# Patient Record
Sex: FEMALE | Race: BLACK OR AFRICAN AMERICAN | NOT HISPANIC OR LATINO | URBAN - METROPOLITAN AREA
[De-identification: names, ages, dates, MRNs, and addresses within clinical notes are randomized per-mention and may not be internally consistent; named-entity substitution may affect disease eponyms.]

---

## 2020-09-29 ENCOUNTER — EMERGENCY (EMERGENCY)
Facility: HOSPITAL | Age: 25
LOS: 0 days | Discharge: HOME | End: 2020-09-29
Attending: STUDENT IN AN ORGANIZED HEALTH CARE EDUCATION/TRAINING PROGRAM
Payer: COMMERCIAL

## 2020-09-29 VITALS — TEMPERATURE: 98 F | SYSTOLIC BLOOD PRESSURE: 107 MMHG | HEART RATE: 74 BPM | DIASTOLIC BLOOD PRESSURE: 50 MMHG

## 2020-09-29 VITALS — WEIGHT: 164.91 LBS

## 2020-09-29 DIAGNOSIS — O23.01 INFECTIONS OF KIDNEY IN PREGNANCY, FIRST TRIMESTER: ICD-10-CM

## 2020-09-29 DIAGNOSIS — R35.0 FREQUENCY OF MICTURITION: ICD-10-CM

## 2020-09-29 DIAGNOSIS — Z3A.11 11 WEEKS GESTATION OF PREGNANCY: ICD-10-CM

## 2020-09-29 DIAGNOSIS — O26.891 OTHER SPECIFIED PREGNANCY RELATED CONDITIONS, FIRST TRIMESTER: ICD-10-CM

## 2020-09-29 DIAGNOSIS — O23.41 UNSPECIFIED INFECTION OF URINARY TRACT IN PREGNANCY, FIRST TRIMESTER: ICD-10-CM

## 2020-09-29 DIAGNOSIS — R10.31 RIGHT LOWER QUADRANT PAIN: ICD-10-CM

## 2020-09-29 LAB
ALBUMIN SERPL ELPH-MCNC: 3.5 G/DL — SIGNIFICANT CHANGE UP (ref 3.3–5)
ALP SERPL-CCNC: 61 U/L — SIGNIFICANT CHANGE UP (ref 40–120)
ALT FLD-CCNC: 18 U/L — SIGNIFICANT CHANGE UP (ref 12–78)
ANION GAP SERPL CALC-SCNC: 8 MMOL/L — SIGNIFICANT CHANGE UP (ref 5–17)
APPEARANCE UR: ABNORMAL
APTT BLD: 30.6 SEC — SIGNIFICANT CHANGE UP (ref 27.5–35.5)
AST SERPL-CCNC: 7 U/L — LOW (ref 15–37)
BASOPHILS # BLD AUTO: 0.03 K/UL — SIGNIFICANT CHANGE UP (ref 0–0.2)
BASOPHILS NFR BLD AUTO: 0.3 % — SIGNIFICANT CHANGE UP (ref 0–2)
BILIRUB SERPL-MCNC: 0.2 MG/DL — SIGNIFICANT CHANGE UP (ref 0.2–1.2)
BILIRUB UR-MCNC: NEGATIVE — SIGNIFICANT CHANGE UP
BUN SERPL-MCNC: 5 MG/DL — LOW (ref 7–23)
CALCIUM SERPL-MCNC: 8.8 MG/DL — SIGNIFICANT CHANGE UP (ref 8.5–10.1)
CHLORIDE SERPL-SCNC: 106 MMOL/L — SIGNIFICANT CHANGE UP (ref 96–108)
CO2 SERPL-SCNC: 22 MMOL/L — SIGNIFICANT CHANGE UP (ref 22–31)
COLOR SPEC: YELLOW — SIGNIFICANT CHANGE UP
CREAT SERPL-MCNC: 0.48 MG/DL — LOW (ref 0.5–1.3)
DIFF PNL FLD: ABNORMAL
EOSINOPHIL # BLD AUTO: 0.12 K/UL — SIGNIFICANT CHANGE UP (ref 0–0.5)
EOSINOPHIL NFR BLD AUTO: 1.1 % — SIGNIFICANT CHANGE UP (ref 0–6)
GLUCOSE SERPL-MCNC: 93 MG/DL — SIGNIFICANT CHANGE UP (ref 70–99)
GLUCOSE UR QL: NEGATIVE MG/DL — SIGNIFICANT CHANGE UP
HCG SERPL-ACNC: HIGH MIU/ML
HCT VFR BLD CALC: 37.3 % — SIGNIFICANT CHANGE UP (ref 34.5–45)
HGB BLD-MCNC: 12.4 G/DL — SIGNIFICANT CHANGE UP (ref 11.5–15.5)
IMM GRANULOCYTES NFR BLD AUTO: 0.5 % — SIGNIFICANT CHANGE UP (ref 0–1.5)
INR BLD: 1.05 RATIO — SIGNIFICANT CHANGE UP (ref 0.88–1.16)
KETONES UR-MCNC: ABNORMAL
LEUKOCYTE ESTERASE UR-ACNC: ABNORMAL
LIDOCAIN IGE QN: 48 U/L — LOW (ref 73–393)
LYMPHOCYTES # BLD AUTO: 3.74 K/UL — HIGH (ref 1–3.3)
LYMPHOCYTES # BLD AUTO: 34 % — SIGNIFICANT CHANGE UP (ref 13–44)
MCHC RBC-ENTMCNC: 30.8 PG — SIGNIFICANT CHANGE UP (ref 27–34)
MCHC RBC-ENTMCNC: 33.2 GM/DL — SIGNIFICANT CHANGE UP (ref 32–36)
MCV RBC AUTO: 92.6 FL — SIGNIFICANT CHANGE UP (ref 80–100)
MONOCYTES # BLD AUTO: 0.83 K/UL — SIGNIFICANT CHANGE UP (ref 0–0.9)
MONOCYTES NFR BLD AUTO: 7.5 % — SIGNIFICANT CHANGE UP (ref 2–14)
NEUTROPHILS # BLD AUTO: 6.23 K/UL — SIGNIFICANT CHANGE UP (ref 1.8–7.4)
NEUTROPHILS NFR BLD AUTO: 56.6 % — SIGNIFICANT CHANGE UP (ref 43–77)
NITRITE UR-MCNC: POSITIVE
PH UR: 6 — SIGNIFICANT CHANGE UP (ref 5–8)
PLATELET # BLD AUTO: 251 K/UL — SIGNIFICANT CHANGE UP (ref 150–400)
POTASSIUM SERPL-MCNC: 3.3 MMOL/L — LOW (ref 3.5–5.3)
POTASSIUM SERPL-SCNC: 3.3 MMOL/L — LOW (ref 3.5–5.3)
PROT SERPL-MCNC: 7.6 GM/DL — SIGNIFICANT CHANGE UP (ref 6–8.3)
PROT UR-MCNC: 30 MG/DL
PROTHROM AB SERPL-ACNC: 12.2 SEC — SIGNIFICANT CHANGE UP (ref 10.6–13.6)
RBC # BLD: 4.03 M/UL — SIGNIFICANT CHANGE UP (ref 3.8–5.2)
RBC # FLD: 12.1 % — SIGNIFICANT CHANGE UP (ref 10.3–14.5)
SODIUM SERPL-SCNC: 136 MMOL/L — SIGNIFICANT CHANGE UP (ref 135–145)
SP GR SPEC: 1.02 — SIGNIFICANT CHANGE UP (ref 1.01–1.02)
UROBILINOGEN FLD QL: 1 MG/DL
WBC # BLD: 11 K/UL — HIGH (ref 3.8–10.5)
WBC # FLD AUTO: 11 K/UL — HIGH (ref 3.8–10.5)

## 2020-09-29 RX ORDER — POTASSIUM CHLORIDE 20 MEQ
40 PACKET (EA) ORAL ONCE
Refills: 0 | Status: COMPLETED | OUTPATIENT
Start: 2020-09-29 | End: 2020-09-29

## 2020-09-29 RX ORDER — CEFUROXIME AXETIL 250 MG
1 TABLET ORAL
Qty: 14 | Refills: 0
Start: 2020-09-29 | End: 2020-10-05

## 2020-09-29 RX ORDER — CEFTRIAXONE 500 MG/1
1000 INJECTION, POWDER, FOR SOLUTION INTRAMUSCULAR; INTRAVENOUS ONCE
Refills: 0 | Status: COMPLETED | OUTPATIENT
Start: 2020-09-29 | End: 2020-09-29

## 2020-09-29 RX ORDER — ACETAMINOPHEN 500 MG
650 TABLET ORAL ONCE
Refills: 0 | Status: COMPLETED | OUTPATIENT
Start: 2020-09-29 | End: 2020-09-29

## 2020-09-29 RX ORDER — SODIUM CHLORIDE 9 MG/ML
1000 INJECTION INTRAMUSCULAR; INTRAVENOUS; SUBCUTANEOUS ONCE
Refills: 0 | Status: COMPLETED | OUTPATIENT
Start: 2020-09-29 | End: 2020-09-29

## 2020-09-29 RX ADMIN — Medication 650 MILLIGRAM(S): at 17:14

## 2020-09-29 RX ADMIN — SODIUM CHLORIDE 1000 MILLILITER(S): 9 INJECTION INTRAMUSCULAR; INTRAVENOUS; SUBCUTANEOUS at 17:49

## 2020-09-29 RX ADMIN — Medication 40 MILLIEQUIVALENT(S): at 18:21

## 2020-09-29 RX ADMIN — SODIUM CHLORIDE 1000 MILLILITER(S): 9 INJECTION INTRAMUSCULAR; INTRAVENOUS; SUBCUTANEOUS at 16:49

## 2020-09-29 RX ADMIN — CEFTRIAXONE 1000 MILLIGRAM(S): 500 INJECTION, POWDER, FOR SOLUTION INTRAMUSCULAR; INTRAVENOUS at 17:39

## 2020-09-29 RX ADMIN — Medication 650 MILLIGRAM(S): at 16:49

## 2020-09-29 NOTE — ED STATDOCS - PATIENT PORTAL LINK FT
You can access the FollowMyHealth Patient Portal offered by [facility] by registering at the following website: [email or web address]. By joining [employer]’s FollowMyHealth portal, you will also be able to view your health information using other applications (apps) compatible with our system.

## 2020-09-29 NOTE — ED ADULT NURSE NOTE - OBJECTIVE STATEMENT
Patient presents to the ED co 9/10 sharp back pain that wraps around to the lower part of her right abdomen that began at about 0430 this morning. Pt states that the sharp pain woke her up out of her sleep and has been getting worse ever since. Pt states that she has also had associated nausea and vomiting and has not been able to hold anything down. Pt states she recently had a UTI for which she took at home remedies. Pt also is endorsing pain in her vaginal area. Pt denies bleeding and burning with urination.

## 2020-09-29 NOTE — ED STATDOCS - CARE PLAN
Principal Discharge DX:	UTI (urinary tract infection)  Secondary Diagnosis:	Pyelonephritis affecting pregnancy in first trimester

## 2020-09-29 NOTE — ED STATDOCS - OBJECTIVE STATEMENT
26 y/o female with a PMHx of  presents to the ED c/o abd pain. Pt states that she woke up with some urinary frequency and dysuria, suprapubic pain. Pt states that she had nausea and vomiting for last month since she has been pregnant. Also c/o some right flank pain. Today pt went to her went to OB, had ultrasound, which was normal diagnosed with UTI, sent here for further evaluation. 26 y/o female with a PMHx of  presents to the ED c/o abd pain. Pt states that she woke up with some urinary frequency and dysuria, suprapubic pain. Pt states that she had nausea and vomiting for last month since she has been pregnant. Also c/o some right flank pain. Today pt went to her went to OB, had pelvic ultrasound, which was normal; diagnosed with UTI, sent here for further evaluation. 26 y/o female with a PMHx of  presents to the ED c/o abd pain. Pt states that she woke up with some urinary frequency and dysuria, suprapubic pain. Pt states that she had nausea and vomiting for last month since she has been pregnant. Also c/o some right flank pain that started today. Today pt went to her went to OB, had pelvic ultrasound, which was normal; diagnosed with UTI, sent here for further evaluation.

## 2020-09-29 NOTE — ED STATDOCS - GASTROINTESTINAL, MLM
abdomen soft, non-distended. Bowel sounds present. +moderate suprapubic tenderness, right CVA tenderness

## 2020-09-29 NOTE — ED STATDOCS - ATTENDING CONTRIBUTION TO CARE
I, Sujata Toribio DO,  performed the initial face to face bedside interview with this patient regarding history of present illness, review of symptoms and relevant past medical, social and family history.  I completed an independent physical examination.  I was the initial provider who evaluated this patient. I have signed out the follow up of any pending tests (i.e. labs, radiological studies) to the ACP.  I have communicated the patient’s plan of care and disposition with the ACP.  The history, relevant review of systems, past medical and surgical history, medical decision making, and physical examination was documented by the scribe in my presence and I attest to the accuracy of the documentation.

## 2020-09-29 NOTE — ED STATDOCS - PROGRESS NOTE DETAILS
Malu DO: Patient with positive UA- IV Rocephin given. Malu DO: patient non toxic; well appearing; afebrile; tolerating po; instructed to f/u with her ob/gyn in 1 day without fail; renal ultrasound neg; strict return precautions given. Results of Lab work and US reviewed with patient. Agreed to F/U with Obstetrician. and take Ceftin as directed.  Wili NP

## 2020-09-29 NOTE — ED STATDOCS - CARE PROVIDER_API CALL
[facility]  [address] [place] [address]  Phone: [phone]  Fax: [phone]  Follow Up Time:

## 2020-09-29 NOTE — ED STATDOCS - NSFOLLOWUPINSTRUCTIONS_ED_ALL_ED_FT
Urinary Tract Infection, Adult  ImageA urinary tract infection (UTI) is an infection of any part of the urinary tract, which includes the kidneys, ureters, bladder, and urethra. These organs make, store, and get rid of urine in the body. UTI can be a bladder infection (cystitis) or kidney infection (pyelonephritis).    What are the causes?  This infection may be caused by fungi, viruses, or bacteria. Bacteria are the most common cause of UTIs. This condition can also be caused by repeated incomplete emptying of the bladder during urination.    What increases the risk?  This condition is more likely to develop if:    You ignore your need to urinate or hold urine for long periods of time.  You do not empty your bladder completely during urination.  You wipe back to front after urinating or having a bowel movement, if you are female.  You are uncircumcised, if you are male.  You are constipated.  You have a urinary catheter that stays in place (indwelling).  You have a weak defense (immune) system.  You have a medical condition that affects your bowels, kidneys, or bladder.  You have diabetes.  You take antibiotic medicines frequently or for long periods of time, and the antibiotics no longer work well against certain types of infections (antibiotic resistance).  You take medicines that irritate your urinary tract.  You are exposed to chemicals that irritate your urinary tract.  You are female.    What are the signs or symptoms?  Symptoms of this condition include:    Fever.  Frequent urination or passing small amounts of urine frequently.  Needing to urinate urgently.  Pain or burning with urination.  Urine that smells bad or unusual.  Cloudy urine.  Pain in the lower abdomen or back.  Trouble urinating.  Blood in the urine.  Vomiting or being less hungry than normal.  Diarrhea or abdominal pain.  Vaginal discharge, if you are female.    How is this diagnosed?  This condition is diagnosed with a medical history and physical exam. You will also need to provide a urine sample to test your urine. Other tests may be done, including:    Blood tests.  Sexually transmitted disease (STD) testing.    If you have had more than one UTI, a cystoscopy or imaging studies may be done to determine the cause of the infections.    How is this treated?  Treatment for this condition often includes a combination of two or more of the following:    Antibiotic medicine.  Other medicines to treat less common causes of UTI.  Over-the-counter medicines to treat pain.  Drinking enough water to stay hydrated.    Follow these instructions at home:  Take over-the-counter and prescription medicines only as told by your health care provider.  If you were prescribed an antibiotic, take it as told by your health care provider. Do not stop taking the antibiotic even if you start to feel better.  Avoid alcohol, caffeine, tea, and carbonated beverages. They can irritate your bladder.  Drink enough fluid to keep your urine clear or pale yellow.  Keep all follow-up visits as told by your health care provider. This is important.  ImageMake sure to:    Empty your bladder often and completely. Do not hold urine for long periods of time.  Empty your bladder before and after sex.  Wipe from front to back after a bowel movement if you are female. Use each tissue one time when you wipe.    Contact a health care provider if:  You have back pain.  You have a fever.  You feel nauseous or vomit.  Your symptoms do not get better after 3 days.  Your symptoms go away and then return.  Get help right away if:  You have severe back pain or lower abdominal pain.  You are vomiting and cannot keep down any medicines or water.  This information is not intended to replace advice given to you by your health care provider. Make sure you discuss any questions you have with your health care provider.      EnglishSpanish                                                                                                                                                                                                                                 Pyelonephritis During Pregnancy           What are the causes?    This condition is caused by a bacterial infection in the lower urinary tract that spreads to the kidney.      What increases the risk?  You are more likely to develop this condition if:  •You have diabetes.      •You have a history of frequent urinary tract infections.        What are the signs or symptoms?  Symptoms of this condition may begin with symptoms of a lower urinary tract infection. These may include:  •A frequent urge to pass urine.      •Burning pain when passing urine.      •Pain and pressure in your lower abdomen.      •Blood in your urine.      •Cloudy or smelly urine.    As the infection spreads to your kidney, you may have these symptoms:  •Fever.      •Chills.      •Pain and tenderness in your upper abdomen or in your back and sides (flank pain). Flank pain often affects one side of the body, usually the right side.      •Nausea, vomiting, or loss of appetite.        How is this diagnosed?  This condition may be diagnosed based on:  •Your symptoms and medical history.      •A physical exam.     •Tests to confirm the diagnosis. These may include:  •Blood tests to check kidney function and look for signs of infection.      •Urine tests to check for signs of infection, including bacteria, white or red blood cells, and protein.      •Tests to grow and identify the type of bacteria that is causing the infection (urine culture).      •Imaging studies of your kidneys to learn more about your condition.          How is this treated?  This condition is treated in the hospital with antibiotics that are given into one of your veins through an IV. Your health care provider:  •Will start you on an antibiotic that is effective against common urinary tract infections.      •May switch to another antibiotic if the results of your urine culture show that your infection is caused by different bacteria.      •Will be careful to choose antibiotics that are the safest during pregnancy.    Other treatments may include:  •IV fluids if you are nauseous and not able to drink fluids.      •Pain and fever medicines.      •Medicines for nausea and vomiting.      You will be able to go home when your infection is under control. To prevent another infection, you may need to continue taking antibiotics by mouth until your baby is born.      Follow these instructions at home:      Medicines      •Take over-the-counter and prescription medicines only as told by your health care provider.      •Take your antibiotic medicine as told by your health care provider. Do not stop taking the antibiotic even if you start to feel better.      •Continue to take your prenatal vitamins.        Lifestyle    •Follow instructions from your health care provider about eating or drinking restrictions. You may need to avoid:  •Foods and drinks with added sugar.      •Caffeine and fruit juice.        •Drink enough fluid to keep your urine pale yellow.      •Go to the bathroom frequently. Do not hold your urine. Try to empty your bladder completely.      •Change your underwear every day. Wear all-cotton underwear. Do not wear tight underwear or pants.        General instructions    •Take these steps to lower the risk of bacteria getting into your urinary tract:  •Use liquid soap instead of bar soap when showering or bathing. Bacteria can grow on bar soap.      •When you wash yourself, clean the urethra opening first. Use a washcloth to clean the area between your vagina and anus. Pat the area dry with a clean towel.      •Wash your hands before and after you go to the bathroom.      •Wipe yourself from front to back after going to the bathroom.      •Do not use douches, perfumed soap, creams, or powders.      •Do not soak in a bath for more than 30 minutes.        •Return to your normal activities as told by your health care provider. Ask your health care provider what activities are safe for you.      •Keep all follow-up visits as told by your health care provider. This is important.        Contact a health care provider if:    •You have chills or a fever.      •You have any symptoms of infection that do not get better at home.      •Symptoms of infection come back.      •You have a reaction or side effects from your antibiotic.        Get help right away if:    •You start having contractions.        Summary    •Pyelonephritis is an infection of the kidney or kidneys.      •This condition results when a bacterial infection in the lower urinary tract spreads to the kidney.      •Lower urinary tract infections are common during pregnancy.      •Pyelonephritis causes chills, a fever, flank pain, and nausea.      •Pyelonephritis is a serious infection that is usually treated in the hospital with IV antibiotics.      This information is not intended to replace advice given to you by your health care provider. Make sure you discuss any questions you have with your health care provider.    Rest. Drink plenty of fluids. Take Ceftin as prescribed. Follow up with Obstetrician.  Return for worsening symptoms.

## 2020-10-01 NOTE — ED POST DISCHARGE NOTE - RESULT SUMMARY
Urine culture + for E. Coli >100K CFU. Sensitivities not available. Pt DC'd home with cefuroxime. - Toby Pratt PA-C

## 2024-04-09 NOTE — ED STATDOCS - NS ED MD DISPO DISCHARGE CCDA
Acupuncture Evaluation Note     Name: [place] [facility] Number: [id number]    Traditional Chinese Medicine (TCM) Diagnosis: Qi Stagnation, Blood Stasis, and Qi Deficiency  Medical Diagnosis:   Encounter Diagnoses   Name Primary?    Malignant neoplasm of endometrium Yes    Secondary malignancy of retroperitoneal lymph nodes     Encounter for antineoplastic chemotherapy         Evaluation Date: 4/9/2024    Visit #/Visits authorized: 12, 10/12    Precautions: Standard and cancer    Subjective     Chief Concern: CIPN, bilateral feet and hands - mostly right hand and feet.      Medical necessity is demonstrated by the following IMPAIRMENTS: Medical Necessity: Decreased mobility limits day to day activities, social, and emergent situations and Decreased quality of life              Aggravating Factors:  movement, lying down, standing, prolonged sitting, and pressure   Relieving Factors:  nothing    Symptom Description:     Quality:  Aching, Burning, Tight, Tingling, and Numb  Severity:  10  Frequency:  continuously      Objective       New Findings:      Treatment     Treatment Principles:  Move qi and blood, nourish qi, stop pain     Acupuncture points used:  4 AWAD, BA YUDI, BA KESHA , Gb34, Ki3, Sp6, Sp9, and St36    Bilateral points:   Unilateral points:   Auricular Treatment:      Needles In: 25  Needles Out: 25  Protection W/ STIM placed: 3:50  Needles W/ STIM removed: 4:20      Assessment     After treatment, patient felt improvement symptoms over the week    Patient prognosis is Good.     Patient will continue to benefit from acupuncture treatment to address the deficits listed in the problem list box on initial evaluation, provide patient family education and to maximize pt's level of independence in the home and community environment.     Patient's spiritual, cultural and educational needs considered and pt agreeable to plan of care and goals.     Anticipated barriers to treatment: none    Plan     Recommend  1 /week for 2 sessions then re-assess.      Education:  Patient is aware of cumulative benefit of acupuncture           Patient/Caregiver provided printed discharge information.